# Patient Record
Sex: MALE | Race: BLACK OR AFRICAN AMERICAN | NOT HISPANIC OR LATINO | URBAN - METROPOLITAN AREA
[De-identification: names, ages, dates, MRNs, and addresses within clinical notes are randomized per-mention and may not be internally consistent; named-entity substitution may affect disease eponyms.]

---

## 2019-07-27 ENCOUNTER — EMERGENCY (EMERGENCY)
Facility: HOSPITAL | Age: 29
LOS: 1 days | Discharge: HOME | End: 2019-07-27
Attending: EMERGENCY MEDICINE
Payer: SELF-PAY

## 2019-07-27 VITALS
WEIGHT: 151.02 LBS | SYSTOLIC BLOOD PRESSURE: 145 MMHG | HEART RATE: 71 BPM | OXYGEN SATURATION: 100 % | HEIGHT: 69 IN | DIASTOLIC BLOOD PRESSURE: 83 MMHG | TEMPERATURE: 98 F | RESPIRATION RATE: 18 BRPM

## 2019-07-27 PROCEDURE — 99285 EMERGENCY DEPT VISIT HI MDM: CPT

## 2019-07-27 PROCEDURE — 99053 MED SERV 10PM-8AM 24 HR FAC: CPT

## 2019-07-27 PROCEDURE — 93010 ELECTROCARDIOGRAM REPORT: CPT

## 2019-07-27 NOTE — ED ADULT TRIAGE NOTE - CHIEF COMPLAINT QUOTE
patient states that he has CHest pain started yesterday and getting worse today, pain with deep inspiration, has HX of heart murmur

## 2019-07-28 VITALS
DIASTOLIC BLOOD PRESSURE: 85 MMHG | OXYGEN SATURATION: 100 % | HEART RATE: 72 BPM | TEMPERATURE: 98 F | RESPIRATION RATE: 19 BRPM | SYSTOLIC BLOOD PRESSURE: 143 MMHG

## 2019-07-28 LAB
ALBUMIN SERPL ELPH-MCNC: 4.3 G/DL — SIGNIFICANT CHANGE UP (ref 3.3–5.2)
ALP SERPL-CCNC: 72 U/L — SIGNIFICANT CHANGE UP (ref 40–120)
ALT FLD-CCNC: 16 U/L — SIGNIFICANT CHANGE UP
ANION GAP SERPL CALC-SCNC: 11 MMOL/L — SIGNIFICANT CHANGE UP (ref 5–17)
APTT BLD: 37.5 SEC — HIGH (ref 27.5–36.3)
AST SERPL-CCNC: 24 U/L — SIGNIFICANT CHANGE UP
BASOPHILS # BLD AUTO: 0.03 K/UL — SIGNIFICANT CHANGE UP (ref 0–0.2)
BASOPHILS NFR BLD AUTO: 0.5 % — SIGNIFICANT CHANGE UP (ref 0–2)
BILIRUB SERPL-MCNC: 0.2 MG/DL — LOW (ref 0.4–2)
BUN SERPL-MCNC: 23 MG/DL — HIGH (ref 8–20)
CALCIUM SERPL-MCNC: 8.7 MG/DL — SIGNIFICANT CHANGE UP (ref 8.6–10.2)
CHLORIDE SERPL-SCNC: 106 MMOL/L — SIGNIFICANT CHANGE UP (ref 98–107)
CO2 SERPL-SCNC: 22 MMOL/L — SIGNIFICANT CHANGE UP (ref 22–29)
CREAT SERPL-MCNC: 1.07 MG/DL — SIGNIFICANT CHANGE UP (ref 0.5–1.3)
D DIMER BLD IA.RAPID-MCNC: <150 NG/ML DDU — SIGNIFICANT CHANGE UP
EOSINOPHIL # BLD AUTO: 0.26 K/UL — SIGNIFICANT CHANGE UP (ref 0–0.5)
EOSINOPHIL NFR BLD AUTO: 4 % — SIGNIFICANT CHANGE UP (ref 0–6)
GLUCOSE SERPL-MCNC: 96 MG/DL — SIGNIFICANT CHANGE UP (ref 70–115)
HCT VFR BLD CALC: 43.1 % — SIGNIFICANT CHANGE UP (ref 39–50)
HGB BLD-MCNC: 14.4 G/DL — SIGNIFICANT CHANGE UP (ref 13–17)
IMM GRANULOCYTES NFR BLD AUTO: 0.2 % — SIGNIFICANT CHANGE UP (ref 0–1.5)
INR BLD: 1.1 RATIO — SIGNIFICANT CHANGE UP (ref 0.88–1.16)
LYMPHOCYTES # BLD AUTO: 3.51 K/UL — HIGH (ref 1–3.3)
LYMPHOCYTES # BLD AUTO: 53.4 % — HIGH (ref 13–44)
MCHC RBC-ENTMCNC: 28.9 PG — SIGNIFICANT CHANGE UP (ref 27–34)
MCHC RBC-ENTMCNC: 33.4 GM/DL — SIGNIFICANT CHANGE UP (ref 32–36)
MCV RBC AUTO: 86.4 FL — SIGNIFICANT CHANGE UP (ref 80–100)
MONOCYTES # BLD AUTO: 0.77 K/UL — SIGNIFICANT CHANGE UP (ref 0–0.9)
MONOCYTES NFR BLD AUTO: 11.7 % — SIGNIFICANT CHANGE UP (ref 2–14)
NEUTROPHILS # BLD AUTO: 1.99 K/UL — SIGNIFICANT CHANGE UP (ref 1.8–7.4)
NEUTROPHILS NFR BLD AUTO: 30.2 % — LOW (ref 43–77)
PLATELET # BLD AUTO: 206 K/UL — SIGNIFICANT CHANGE UP (ref 150–400)
POTASSIUM SERPL-MCNC: 4.3 MMOL/L — SIGNIFICANT CHANGE UP (ref 3.5–5.3)
POTASSIUM SERPL-SCNC: 4.3 MMOL/L — SIGNIFICANT CHANGE UP (ref 3.5–5.3)
PROT SERPL-MCNC: 7.3 G/DL — SIGNIFICANT CHANGE UP (ref 6.6–8.7)
PROTHROM AB SERPL-ACNC: 12.7 SEC — SIGNIFICANT CHANGE UP (ref 10–12.9)
RBC # BLD: 4.99 M/UL — SIGNIFICANT CHANGE UP (ref 4.2–5.8)
RBC # FLD: 12.4 % — SIGNIFICANT CHANGE UP (ref 10.3–14.5)
SODIUM SERPL-SCNC: 139 MMOL/L — SIGNIFICANT CHANGE UP (ref 135–145)
TROPONIN T SERPL-MCNC: <0.01 NG/ML — SIGNIFICANT CHANGE UP (ref 0–0.06)
WBC # BLD: 6.57 K/UL — SIGNIFICANT CHANGE UP (ref 3.8–10.5)
WBC # FLD AUTO: 6.57 K/UL — SIGNIFICANT CHANGE UP (ref 3.8–10.5)

## 2019-07-28 PROCEDURE — 80053 COMPREHEN METABOLIC PANEL: CPT

## 2019-07-28 PROCEDURE — 85730 THROMBOPLASTIN TIME PARTIAL: CPT

## 2019-07-28 PROCEDURE — 36415 COLL VENOUS BLD VENIPUNCTURE: CPT

## 2019-07-28 PROCEDURE — 93005 ELECTROCARDIOGRAM TRACING: CPT

## 2019-07-28 PROCEDURE — 85027 COMPLETE CBC AUTOMATED: CPT

## 2019-07-28 PROCEDURE — 85379 FIBRIN DEGRADATION QUANT: CPT

## 2019-07-28 PROCEDURE — 71046 X-RAY EXAM CHEST 2 VIEWS: CPT | Mod: 26

## 2019-07-28 PROCEDURE — 84484 ASSAY OF TROPONIN QUANT: CPT

## 2019-07-28 PROCEDURE — 99284 EMERGENCY DEPT VISIT MOD MDM: CPT | Mod: 25

## 2019-07-28 PROCEDURE — 71046 X-RAY EXAM CHEST 2 VIEWS: CPT

## 2019-07-28 PROCEDURE — 85610 PROTHROMBIN TIME: CPT

## 2019-07-28 NOTE — ED PROVIDER NOTE - OBJECTIVE STATEMENT
28yo male pmhx VSD presents to ED for left sided chest pain x 2 days. Pt notes 1 episode of sharp left sided chest pain x 2 days ago that lasted a few minutes and quickly subsided. Pt states pain has been intermittent over the course of yesterday and this evening, multiple episodes of pain to left chest, worse with deep inspiration. Pt states he is afraid to take deep breath due to fear of pain returning. No further complaints. Denies fever, chills, palpitations, recent travel, diaphoresis, SOB, wheezing, dizziness, HA.

## 2019-07-28 NOTE — ED PROVIDER NOTE - ATTENDING CONTRIBUTION TO CARE
Susan: I performed a face to face bedside interview with patient regarding history of present illness, review of symptoms and past medical history. I completed an independent physical exam.  I have discussed patient's plan of care with advanced care provider.   I agree with note as stated above including HISTORY OF PRESENT ILLNESS, HIV, PAST MEDICAL/SURGICAL/FAMILY/SOCIAL HISTORY, ALLERGIES AND HOME MEDICATIONS, REVIEW OF SYSTEMS, PHYSICAL EXAM, MEDICAL DECISION MAKING and any PROGRESS NOTES during the time I functioned as the attending physician for this patient  unless otherwise noted. My brief assessment is as follows: intermittent pleuritic cp, no hx dvt/pe, no acs risk factors. dimer neg, ekg wnl. xr neg. return precautions

## 2019-07-28 NOTE — ED PROVIDER NOTE - NS ED ROS FT
Gen: denies weakness, malaise/fatigue, fever, chills  Skin: denies rashes, hives  HEENT: denies headaches, LOC, visual changes  Respiratory: denies cough, dyspnea, WILKINS, SOB, wheezing  Cardiovascular: +CHEST PAIN. Denies palpitations, diaphoresis, LE edema  GI: denies abdominal pain, nausea/vomiting, diarrhea/constipation  : denies dysuria, hematuria, frequency, urgency, hesitancy, incontinence of bowel/bladder  MSK: denies limitation on movement, weakness, joint swelling/redness/warmth  Neuro: denies LOC, convulsions/seizures, syncope, headache, dizziness, vertigo

## 2019-07-28 NOTE — ED PROVIDER NOTE - PHYSICAL EXAMINATION
Const: Awake, alert and oriented. In no acute distress. Well appearing.  HEENT: NC/AT. Moist mucous membranes.  Eyes: No scleral icterus. EOMI.  Cardiac: Regular rate and regular rhythm. murmur noted to LLSB. +S1/S2. Peripheral pulses 2+ and symmetric. No LE edema.  Resp: Speaking in full sentences. No evidence of respiratory distress. No wheezes, rales or rhonchi.  Abd: Soft, non-tender, non-distended. Normal bowel sounds in all 4 quadrants. No guarding or rebound.  Back: Spine midline and non-tender. No CVAT.  Skin: No rashes, abrasions or lacerations.  Neuro: Awake, alert & oriented x 3. Moves all extremities symmetrically.

## 2019-07-28 NOTE — ED ADULT NURSE NOTE - OBJECTIVE STATEMENT
Pt c/o left pectoral chest pain since yesterday. Pt states pain presented just once yesterday but became persistent today and makes it difficult to take a deep breath. Pt states when it occurs he gets light headed because he has trouble breathing. Pain radiates to left side of chest towards the back. Pt denies N/V/D, headache, blurred vision, hot flashes. Pt has hx of heart murmur.

## 2019-07-28 NOTE — ED PROVIDER NOTE - CLINICAL SUMMARY MEDICAL DECISION MAKING FREE TEXT BOX
Pt well appearing, in NAD, VSS. HEART SCORE 1. Trop negative x 1, D-dimer negative. EKG with no ischemic changes. Pt stable for dc at this time. Pt instructed to f/u as outpt with PMD/cardiology if symptoms persist.

## 2019-07-28 NOTE — ED ADULT NURSE REASSESSMENT NOTE - NS ED NURSE REASSESS COMMENT FT1
pt hemodynamically stable, PIV removed, refer to flowsheet and chart, pt to be discharged, pt understood discharge instructions and plan of care. Medically cleared by MD Davis.

## 2019-08-01 PROBLEM — R01.1 CARDIAC MURMUR, UNSPECIFIED: Chronic | Status: ACTIVE | Noted: 2019-07-28

## 2019-08-23 ENCOUNTER — APPOINTMENT (OUTPATIENT)
Dept: CARDIOLOGY | Facility: CLINIC | Age: 29
End: 2019-08-23
Payer: COMMERCIAL

## 2019-08-23 ENCOUNTER — NON-APPOINTMENT (OUTPATIENT)
Age: 29
End: 2019-08-23

## 2019-08-23 VITALS
DIASTOLIC BLOOD PRESSURE: 68 MMHG | SYSTOLIC BLOOD PRESSURE: 111 MMHG | OXYGEN SATURATION: 99 % | WEIGHT: 152 LBS | HEIGHT: 69 IN | HEART RATE: 75 BPM | BODY MASS INDEX: 22.51 KG/M2

## 2019-08-23 VITALS — DIASTOLIC BLOOD PRESSURE: 75 MMHG | SYSTOLIC BLOOD PRESSURE: 125 MMHG

## 2019-08-23 DIAGNOSIS — Z87.01 PERSONAL HISTORY OF PNEUMONIA (RECURRENT): ICD-10-CM

## 2019-08-23 DIAGNOSIS — Z87.440 PERSONAL HISTORY OF URINARY (TRACT) INFECTIONS: ICD-10-CM

## 2019-08-23 DIAGNOSIS — Z78.9 OTHER SPECIFIED HEALTH STATUS: ICD-10-CM

## 2019-08-23 DIAGNOSIS — R07.9 CHEST PAIN, UNSPECIFIED: ICD-10-CM

## 2019-08-23 PROCEDURE — 93306 TTE W/DOPPLER COMPLETE: CPT

## 2019-08-23 PROCEDURE — 99204 OFFICE O/P NEW MOD 45 MIN: CPT

## 2019-08-23 PROCEDURE — 93000 ELECTROCARDIOGRAM COMPLETE: CPT

## 2019-08-23 NOTE — PHYSICAL EXAM
[General Appearance - Well Nourished] : well nourished [General Appearance - Well Developed] : well developed [Normal Conjunctiva] : the conjunctiva exhibited no abnormalities [Normal Oral Mucosa] : normal oral mucosa [Heart Rate And Rhythm] : heart rate and rhythm were normal [Heart Sounds] : normal S1 and S2 [Auscultation Breath Sounds / Voice Sounds] : lungs were clear to auscultation bilaterally [Bowel Sounds] : normal bowel sounds [Abdomen Soft] : soft [Cyanosis, Localized] : no localized cyanosis [] : no rash [FreeTextEntry1] : 3/6 Holosystolic murmur along LSB

## 2019-08-23 NOTE — ASSESSMENT
[FreeTextEntry1] : EKG- NSR RSR.\par \par Assessment:\par 1. Chest pain\par 2. Heart murmur-?VSD\par \par Recommendations:\par 1. ECHO\par 2. Stress Test\par 3. F/U after testing\par 4. Patient advised to get his pediatric cardiology records\par

## 2019-08-23 NOTE — HISTORY OF PRESENT ILLNESS
[FreeTextEntry1] : \par \par Patient is a 29-year-old  male with a history of a heart murmur since childhood, patient states he was told he has VSD, patient has noted chest pain in the last one month. Patient's chest pains located in the left side of the chest, localized pain, nonradiating, lasting for a few minutes, he had a total of 3 episodes in the last one month denies any associated nausea vomiting diaphoresis palpitations or syncope. Patient has some shortness of breath or chest pain. All other systems reviewed negative\par \par Patient had gone to [facility] emergency room, hospital records reviewed, patient had a negative d-dimer, negative troponin EKG and chest x-ray were reviewed.

## 2019-10-03 ENCOUNTER — APPOINTMENT (OUTPATIENT)
Dept: CARDIOLOGY | Facility: CLINIC | Age: 29
End: 2019-10-03
Payer: COMMERCIAL

## 2019-10-03 DIAGNOSIS — I49.1 ATRIAL PREMATURE DEPOLARIZATION: ICD-10-CM

## 2019-10-03 PROCEDURE — 93015 CV STRESS TEST SUPVJ I&R: CPT

## 2019-10-18 ENCOUNTER — APPOINTMENT (OUTPATIENT)
Dept: CARDIOLOGY | Facility: CLINIC | Age: 29
End: 2019-10-18
Payer: COMMERCIAL

## 2019-10-18 VITALS
HEART RATE: 73 BPM | OXYGEN SATURATION: 99 % | DIASTOLIC BLOOD PRESSURE: 77 MMHG | WEIGHT: 146 LBS | BODY MASS INDEX: 21.62 KG/M2 | HEIGHT: 69 IN | SYSTOLIC BLOOD PRESSURE: 121 MMHG

## 2019-10-18 PROCEDURE — 99213 OFFICE O/P EST LOW 20 MIN: CPT

## 2019-10-18 NOTE — PHYSICAL EXAM
[General Appearance - Well Nourished] : well nourished [General Appearance - Well Developed] : well developed [Normal Conjunctiva] : the conjunctiva exhibited no abnormalities [Normal Oral Mucosa] : normal oral mucosa [Auscultation Breath Sounds / Voice Sounds] : lungs were clear to auscultation bilaterally [Heart Rate And Rhythm] : heart rate and rhythm were normal [Heart Sounds] : normal S1 and S2 [Bowel Sounds] : normal bowel sounds [Cyanosis, Localized] : no localized cyanosis [Abdomen Soft] : soft [] : no rash [FreeTextEntry1] : 3/6 Holosystolic murmur along LSB

## 2019-10-18 NOTE — ASSESSMENT
[FreeTextEntry1] : EKG- NSR RSR.\par ECHO 8/2019- LVEF 55-60%. Mildly dilated LV, Moderate Aortic Regurgitation. Small perimembranous VSD. Echodensity noted on right coronary cusp\par Stress Test 10/3/2019- Negative Stress Test\par Holter Monitor 10/3/2019- NSR, Freq PAC's and brief runs of PSVT\par \par Assessment:\par 1. VSD\par 2. Bicuspid Aortic Valve with echodensity on right coronary cusp\par \par \par Recommendations:\par Patient is advised to keep his appointment in 11/2019 with Adult Congenital Heart Disease Specialist\par F/U in 3 months\par 4. Patient advised to get his pediatric cardiology records\par

## 2019-10-18 NOTE — HISTORY OF PRESENT ILLNESS
[FreeTextEntry1] : \par \par Patient is a 29-year-old  male with a history of a heart murmur since childhood, patient states he was told he has VSD, patient was seen by me in 8/2019 for chest pain.\par \par Patient presents for a follow up visit. Since last visit patient had an echocardiogram which revealed he has a VSD along with the bicuspid aortic valve. Patient has an upcoming appointment with Adult Congenital Heart  disease specialist.Patient had a stress test which was unremarkable. Patient is doing fine denying any chest pain palpitations or syncope. Overall patient is asymptomatic.

## 2019-11-08 ENCOUNTER — APPOINTMENT (OUTPATIENT)
Dept: PEDIATRIC CARDIOLOGY | Facility: CLINIC | Age: 29
End: 2019-11-08
Payer: COMMERCIAL

## 2019-11-08 VITALS
SYSTOLIC BLOOD PRESSURE: 136 MMHG | HEART RATE: 64 BPM | WEIGHT: 149.69 LBS | HEIGHT: 68.11 IN | DIASTOLIC BLOOD PRESSURE: 92 MMHG | RESPIRATION RATE: 20 BRPM | BODY MASS INDEX: 22.69 KG/M2 | OXYGEN SATURATION: 100 %

## 2019-11-08 PROCEDURE — 99205 OFFICE O/P NEW HI 60 MIN: CPT | Mod: 25

## 2019-11-08 PROCEDURE — 93000 ELECTROCARDIOGRAM COMPLETE: CPT

## 2019-11-08 PROCEDURE — 93325 DOPPLER ECHO COLOR FLOW MAPG: CPT

## 2019-11-08 PROCEDURE — 93320 DOPPLER ECHO COMPLETE: CPT

## 2019-11-08 PROCEDURE — 93303 ECHO TRANSTHORACIC: CPT

## 2019-11-13 NOTE — PHYSICAL EXAM
[General Appearance - In No Acute Distress] : in no acute distress [General Appearance - Alert] : alert [General Appearance - Well Nourished] : well nourished [General Appearance - Well Developed] : well developed [Facies] : the head and face were normal in appearance [Appearance Of Head] : the head was normocephalic [Sclera] : the sclera were normal [Examination Of The Oral Cavity] : mucous membranes were moist and pink [Respiration, Rhythm And Depth] : normal respiratory rhythm and effort [Auscultation Breath Sounds / Voice Sounds] : breath sounds clear to auscultation bilaterally [No Cough] : no cough [Stridor] : no stridor was observed [Normal Chest Appearance] : the chest was normal in appearance [Apical Impulse] : quiet precordium with normal apical impulse [Heart Rate And Rhythm] : normal heart rate and rhythm [Heart Sounds] : normal S1 and S2 [Heart Sounds Gallop] : no gallops [Arterial Pulses] : normal upper and lower extremity pulses with no pulse delay [Heart Sounds Pericardial Friction Rub] : no pericardial rub [Capillary Refill Test] : normal capillary refill [Edema] : no edema [IV] : a grade 4/6   [LMSB] : LMSB  [Holosystolic] : holosystolic [Harsh] : harsh [Abdomen Soft] : soft [Abdomen Tenderness] : non-tender [Nondistended] : nondistended [] : no hepato-splenomegaly [Nail Clubbing] : no clubbing  or cyanosis of the fingers [Musculoskeletal - Swelling] : no joint swelling seen [Motor Tone] : muscle strength and tone were normal [Abnormal Walk] : normal gait [Skin Color & Pigmentation] : normal skin color and pigmentation [Demonstrated Behavior - Infant Nonreactive To Parents] : interactive [Mood] : mood and affect were appropriate for age [FreeTextEntry1] : muscular build

## 2019-11-13 NOTE — HISTORY OF PRESENT ILLNESS
[FreeTextEntry1] : We had the pleasure of seeing Gustavo Dallas in the Adult Congenital Heart Program of [facility] on November 8, 2019 for an initial consultation.  As you know, Gustavo was born with a bicuspid aortic valve and ventricular septal defect.  He reports being followed by Dr. Soto, one of our colleagues in pediatric and congenital interventional cardiology, up until being a teenager.  He was then lost to follow up as he felt well and didn't notice any limitations.  In August of this year, he presented to the [facility] emergency room for chest pain and was discharged home after an initial negative cardiac work up.  He reported chest pain at rest that came and went on their own for about two to three weeks prior to presentation to the ER.  He was evaluated by Dr. Jennings at [facility] in [place] on August 23, 2019 for an initial consultation. Testing at Dr. Jennings's office included:\par \par 8/23/2019, ECG: sinus rhythm\par \par 8/23/2019, TTE: normal LV function, mildly increased LV diameter, bicuspid aortic valve with moderate insufficiency, moderately dilated LA,  perimembranous VSD with left to right shunting, echodensity visualized on the right coronary cusp\par \par 10/3/2019-10/5/2019, Holter: sinus rhythm with sinus arrhythmia, 49 bpm-158 bpm, avg 78 bpm, isolated PVCs, rare ventricular coupelts, frequent PACs, fastest atrial was 168 bpm for 3 beats, longest atrial was 4 beats at 124 bpm; no patient triggered events\par \par 10/3/2019, exercise stress: Total time 11 min, 12 METS, normal HR/BP response, sinus rhythm with frequent PACs that decreased in frequency with exercise, no symptoms; overall: negative stress test by ECG criteria at 88% MPHR\par \par 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  He plays basketball regularly without any limitations.  He denies chest pain, palpitations, shortness of breath, near syncope, syncope, PND, orthopnea, or lower extremity swelling.\par \par He last saw a dentist 2 years ago.  He drinks alcohol socially.  He smokes marijuana daily.\par \par

## 2019-11-13 NOTE — CONSULT LETTER
[Today's Date] : [unfilled] [Name] : Name: [unfilled] [] : : ~~ [Today's Date:] : [unfilled] [Dear  ___:] : Dear Dr. [unfilled]: [Sincerely,] : Sincerely, [Consult - Multiple Provider] : Thank you very much for allowing us to participate in the care of this patient. If you have any questions, please do not hesitate to contact us. [FreeTextEntry4] : Dr. Kolby Jennings [FreeTextEntry5] : [facility] [place] [de-identified] : Fely Hurley, MSN, CPNP-AC, PC\par Pediatric Cardiology, Adult Congenital Cardiology\par [facility] P[facility]\par \par Gustavo Garcia MD\par Pediatric Cardiology\par Adult Congenital Heart Disease\par  of Pediatrics\par [employer] [facility] [employer] [facility]\par

## 2019-11-13 NOTE — REVIEW OF SYSTEMS
[Chest Pain] : chest pain  or discomfort [Feeling Poorly] : not feeling poorly (malaise) [Cyanosis] : no cyanosis [Edema] : no edema [Diaphoresis] : not diaphoretic [Palpitations] : no palpitations [Exercise Intolerance] : no persistence of exercise intolerance [Fast HR] : no tachycardia [Orthopnea] : no orthopnea [Cough] : no cough [Tachypnea] : not tachypneic [Fainting (Syncope)] : no fainting [Shortness Of Breath] : not expressed as feeling short of breath [Headache] : no headache [Easy Bruising] : no tendency for easy bruising [Dizziness] : no dizziness [Easy Bleeding] : no ~M tendency for easy bleeding [Depression] : no depression [Anxiety] : no anxiety

## 2019-11-13 NOTE — DISCUSSION/SUMMARY
[Influenza vaccine is recommended] : Influenza vaccine is recommended [FreeTextEntry1] : In summary, Gustavo is a 29 year old male with history of a bicuspid aortic valve and ventricular septal defect who presented back to care after what appears to be noncardiac chest pain. His work up thus far has revealed interval worsening of the degree of aortic insufficiency associated with his bicuspid aortic valve.  We were able to review an echocardiogram from 2004, at which time he has minimal aortic insufficiency and less distortion to the aortic valve including less sclerosis to the tips of the right cusp.  The right aortic cusp appears to be wind socked into the ventricular septal defect and the coaptation point of the three commissures appears to be where the jet of aortic insufficiency starts.  The VSD is pressure restrictive. The left heart appears and measures mildly dilated.  \par \par Typically, worsening aortic insufficiency in relation to this type of ventricular septal defect is a surgical indication for VSD closure and aortic valve repair.  However, given the bicuspid valve anatomy and current distortion, a successful valve repair may prove challenging.   On the other hand, closure of VSD and buttressing the prolapse the right cusp may prevent acceleration of aortic valve dysfunction associated with a bicuspid valve.  We would recommend a congenital cardiac MRI to evaluate the shunt fraction and obtain volumetric analysis of left sided volumes and aortic insufficiency.  We will also review the data with our CV surgery and congenital cardiology colleagues.  \par \par Of note, Gustavo's blood pressure was elevated at our visit today. We recommended close follow up with you regarding blood pressure management. Should it be persistently elevated, it would be reasonable to consider an ARB given the aortopathy associated with a bicuspid aortic valve.\par \par Follow up pending the results of the MRI and our surgical discussion.  We will keep you updated with the plan moving forward

## 2019-11-13 NOTE — CARDIOLOGY SUMMARY
[Today's Date] : [unfilled] [FreeTextEntry1] : normal sinus rhythm\par possible right atrial enlargement\par  [FreeTextEntry2] : small to moderate perimembranous restrictive (secondary to aneurysmal tissue/right aortic cusp) with left to right shunt\par VSD gradient 100-120 mmHg\par tricommissural, functionally bicuspid aortic valve with left/right fusion\par thickening of tips of right aortic cusp\par mild to moderate aortic insufficiency\par mildly dilated LV (LVIDd 6.05 cm, Z + 2.77)\par Aortic root 3.76 cm (Z + 3.21)

## 2019-11-20 ENCOUNTER — APPOINTMENT (OUTPATIENT)
Dept: MRI IMAGING | Facility: HOSPITAL | Age: 29
End: 2019-11-20
Payer: COMMERCIAL

## 2019-11-20 ENCOUNTER — OUTPATIENT (OUTPATIENT)
Dept: OUTPATIENT SERVICES | Age: 29
LOS: 1 days | End: 2019-11-20

## 2019-11-20 DIAGNOSIS — Q23.1 CONGENITAL INSUFFICIENCY OF AORTIC VALVE: ICD-10-CM

## 2019-11-20 DIAGNOSIS — Q21.0 VENTRICULAR SEPTAL DEFECT: ICD-10-CM

## 2019-11-20 DIAGNOSIS — R07.9 CHEST PAIN, UNSPECIFIED: ICD-10-CM

## 2019-11-20 PROCEDURE — 71555 MRI ANGIO CHEST W OR W/O DYE: CPT | Mod: 26

## 2019-11-20 PROCEDURE — 75565 CARD MRI VELOC FLOW MAPPING: CPT | Mod: 26

## 2019-11-20 PROCEDURE — 75561 CARDIAC MRI FOR MORPH W/DYE: CPT | Mod: 26

## 2020-01-10 ENCOUNTER — APPOINTMENT (OUTPATIENT)
Dept: CARDIOLOGY | Facility: CLINIC | Age: 30
End: 2020-01-10

## 2021-01-15 ENCOUNTER — EMERGENCY (EMERGENCY)
Facility: HOSPITAL | Age: 31
LOS: 1 days | Discharge: HOME | End: 2021-01-15
Payer: COMMERCIAL

## 2021-01-15 VITALS
OXYGEN SATURATION: 98 % | HEIGHT: 69 IN | DIASTOLIC BLOOD PRESSURE: 81 MMHG | TEMPERATURE: 98 F | HEART RATE: 73 BPM | RESPIRATION RATE: 16 BRPM | SYSTOLIC BLOOD PRESSURE: 123 MMHG

## 2021-01-15 LAB — SARS-COV-2 RNA SPEC QL NAA+PROBE: SIGNIFICANT CHANGE UP

## 2021-01-15 PROCEDURE — U0003: CPT

## 2021-01-15 PROCEDURE — 99283 EMERGENCY DEPT VISIT LOW MDM: CPT

## 2021-01-15 PROCEDURE — U0005: CPT

## 2021-01-16 NOTE — ED PROVIDER NOTE - PATIENT PORTAL LINK FT
You can access the FollowMyHealth Patient Portal offered by [facility] by registering at the following website: [email or web address]. By joining [employer]’s FollowMyHealth portal, you will also be able to view your health information using other applications (apps) compatible with our system.

## 2021-02-16 ENCOUNTER — EMERGENCY (EMERGENCY)
Facility: HOSPITAL | Age: 31
LOS: 1 days | Discharge: HOME | End: 2021-02-16
Admitting: EMERGENCY MEDICINE
Payer: COMMERCIAL

## 2021-02-16 VITALS
DIASTOLIC BLOOD PRESSURE: 81 MMHG | TEMPERATURE: 98 F | OXYGEN SATURATION: 99 % | RESPIRATION RATE: 20 BRPM | HEIGHT: 69 IN | HEART RATE: 89 BPM | SYSTOLIC BLOOD PRESSURE: 125 MMHG

## 2021-02-16 LAB — SARS-COV-2 RNA SPEC QL NAA+PROBE: SIGNIFICANT CHANGE UP

## 2021-02-16 PROCEDURE — U0003: CPT

## 2021-02-16 PROCEDURE — 99283 EMERGENCY DEPT VISIT LOW MDM: CPT

## 2021-02-16 PROCEDURE — U0005: CPT

## 2021-02-16 PROCEDURE — 99282 EMERGENCY DEPT VISIT SF MDM: CPT

## 2021-02-16 NOTE — ED ADULT TRIAGE NOTE - CHIEF COMPLAINT QUOTE
Patient presents to ER requesting COVID test, denies symptoms, recent exposure to COVID family member

## 2021-02-16 NOTE — ED PROVIDER NOTE - PATIENT PORTAL LINK FT
You can access the FollowMyHealth Patient Portal offered by [facility] by registering at the following website: [email or web address]. By joining [employer]’s FollowMyHealth portal, you will also be able to view your health information using other applications (apps) compatible with our system.

## 2021-02-16 NOTE — ED PROVIDER NOTE - NS ED ROS FT
Const: no fever , no chills  Eyes: no redness, no discharge  ENT: no ear pain, no throat pain, no congestion  Cardiac: no chest pain  Resp: no cough, no SOB  GI: no abd pain, no n/v/d  : no dysuria   Skin: no rash  Neuro: no HA

## 2021-02-16 NOTE — ED PROVIDER NOTE - CLINICAL SUMMARY MEDICAL DECISION MAKING FREE TEXT BOX
Pt presenting for COVID testing  -Pt does not meet current COVID-19 criteria listed in most updated guidelines as per [facility] protocol/algorithm for admission at this time   -Lengthy discussion with patient regarding home quarantine, follow up with PMD, anticipatory guidance provided and supportive care recommended. Advised immediate return if worsening symptoms, strict return precautions, especially if short of breath, chest pain, inability to tolerate food/liquid. Pt given pre-printed [facility] Novel Coronavirus (COVID19) information packet which contains instructions on time frame of result and how to obtain. Pt verbalized understanding and agreement of plan.

## 2021-03-04 ENCOUNTER — EMERGENCY (EMERGENCY)
Facility: HOSPITAL | Age: 31
LOS: 1 days | Discharge: HOME | End: 2021-03-04
Payer: COMMERCIAL

## 2021-03-04 VITALS
HEART RATE: 85 BPM | SYSTOLIC BLOOD PRESSURE: 142 MMHG | TEMPERATURE: 98 F | HEIGHT: 69 IN | RESPIRATION RATE: 16 BRPM | OXYGEN SATURATION: 99 % | DIASTOLIC BLOOD PRESSURE: 93 MMHG

## 2021-03-04 LAB — SARS-COV-2 RNA SPEC QL NAA+PROBE: SIGNIFICANT CHANGE UP

## 2021-03-04 PROCEDURE — 99283 EMERGENCY DEPT VISIT LOW MDM: CPT

## 2021-03-04 PROCEDURE — U0005: CPT

## 2021-03-04 PROCEDURE — 99284 EMERGENCY DEPT VISIT MOD MDM: CPT

## 2021-03-04 PROCEDURE — U0003: CPT

## 2021-03-04 NOTE — ED PROVIDER NOTE - OBJECTIVE STATEMENT
Pt presenting to the ER for COVID-19 testing. Pt states he lost his sense of taste today and would like to be tested at this time. No other complaints.

## 2021-03-04 NOTE — ED PROVIDER NOTE - PATIENT PORTAL LINK FT
You can access the FollowMyHealth Patient Portal offered by [facility] by registering at the following website: [email or web address]. By joining [employer]’s FollowMyHealth portal, you will also be able to view your health information using other applications (apps) compatible with our system.

## 2021-03-04 NOTE — ED PROVIDER NOTE - CLINICAL SUMMARY MEDICAL DECISION MAKING FREE TEXT BOX
Pt nontoxic appearing, stable vitals, ambulatory with stable saturation without supplemental oxygen. PT does not meet criteria listed in most updated guidelines as per [facility] protocol/algorithm for admission at this time. pt advised about self-quarantine instructions until negative test results and/or symptom resolution. pt advised on hand hygiene, monitoring of symptoms, antipyretic use as well as and fu with primary care provider. Instructions given in pre-printed copy.

## 2021-03-04 NOTE — ED PROVIDER NOTE - IV ALTEPLASE DOOR HIDDEN
No. JESUS screening performed.  STOP BANG Legend: 0-2 = LOW Risk; 3-4 = INTERMEDIATE Risk; 5-8 = HIGH Risk
show

## 2022-04-14 NOTE — ED ADULT NURSE NOTE - NEURO WDL
Staff contacted patient's daughter staff could not leave a message on VM due to mailbox not being set up    Alert and oriented to person, place and time, memory intact, behavior appropriate to situation, PERRL.

## 2022-12-05 NOTE — ED ADULT NURSE NOTE - RESPIRATORY ASSESSMENT
CONSULTATION NOTE - OUTPATIENT      CHIEF COMPLAINT  No chief complaint on file.       Mr. Jain is evaluated today at the request of Dr. Atwood.    HISTORY OF PRESENT ILLNESS  [age] old male with past medical history of bradycardia, hyperlipidemia comes today for sleep apnea evaluation.  Patient's main concern is witnessed apnea as mentioned by his spouse.  This is a chronic problem associated with snoring.  Patient denies daytime sleepiness, gasping/choking spells, morning headaches or excessive night sweats.  No other history suggestive of significant parasomnias, restless leg syndrome or narcolepsy.  Family history of sleep apnea-brother, patient is not taking sleeping aids.    Bedtime is around 10:00 p.m., sleep latency is few minutes, wake after sleep onset is minimal, wake up time is 7:00 a.m. naps no Total sleep time is 8-9 hours.    [place] score is 3    Overall quality of life score ( 1 life is distressing, 4 life is so-so and 7 life is great ):  7              MEDICATIONS  The patient's current medications were reviewed.    HISTORIES  Past Medical History:   Diagnosis Date   • Actinic keratoses    • Basal cell carcinoma of upper back    • Cherry angioma    • Heart murmur    • Hyperlipidemia    • Impaired fasting glucose    • Lentigines    • Mild mitral regurgitation    • Plantar fasciitis of right foot    • Seborrheic keratoses        Past Surgical History:   Procedure Laterality Date   • Colonoscopy  2013; 2011    fax form [facility]. in [place], [place]. DOS-3/25/2013. Impression: Normal Colonoscopy   • Vasectomy  1993    done in [place]        Family History   Problem Relation Age of Onset   • Diabetes Father    • Congestive Heart Failure Father    • Aneurysm Father         AAA   • Lung Disease Mother         on Oxygen for the last few years of her life   • Cancer, Endometrial Sister         uterine cancer   • Diabetes Brother    • Cancer Brother         BCC; MM        Social  History     Socioeconomic History   • Marital status: /Civil Union     Spouse name: Kathryn   • Number of children: 2   • Years of education: Not on file   • Highest education level: Not on file   Occupational History   • Occupation:  group head for    Tobacco Use   • Smoking status: Never Smoker   • Smokeless tobacco: Never Used   Substance and Sexual Activity   • Alcohol use: Yes     Alcohol/week: 6.0 - 7.0 standard drinks     Types: 6 - 7 Standard drinks or equivalent per week   • Drug use: No   • Sexual activity: Yes     Partners: Female     Comment: had vesectomy   Other Topics Concern   • Not on file   Social History Narrative   • Not on file     Social Determinants of Health     Financial Resource Strain: Not on file   Food Insecurity: Not on file   Transportation Needs: Not on file   Physical Activity: Not on file   Stress: Not on file   Social Connections: Not on file   Intimate Partner Violence: Not on file          REVIEW OF SYSTEMS    Review of Systems   Constitutional: Negative.    HENT: Negative.    Eyes: Negative.    Respiratory: Negative.    Cardiovascular: Negative.    Gastrointestinal: Negative.    Genitourinary: Negative.    Musculoskeletal: Negative.    Skin: Negative.    Neurological: Negative.    Endo/Heme/Allergies: Negative.    Psychiatric/Behavioral: Negative.        PHYSICAL EXAM  There were no vitals taken for this visit.   Physical Exam  Vitals and nursing note reviewed.   Constitutional:       General: He is not in acute distress.     Appearance: He is not diaphoretic.   HENT:      Head: Normocephalic and atraumatic.      Comments: Tympanic membrane normal bilaterally, Mallampati score 4, No deviated nasal septum or significant nasal turbinate hypertrophy, neck supple normal ROM, no lymphadenopathy/thyromegaly.       Right Ear: External ear normal.      Left Ear: External ear normal.   Eyes:      General:         Right eye: No discharge.         Left eye: No discharge.       Extraocular Movements: Extraocular movements intact.      Conjunctiva/sclera: Conjunctivae normal.      Pupils: Pupils are equal, round, and reactive to light.   Neck:      Thyroid: No thyromegaly.   Cardiovascular:      Rate and Rhythm: Normal rate.      Heart sounds: No murmur heard.    No friction rub. No gallop.   Pulmonary:      Effort: Pulmonary effort is normal. No respiratory distress.      Breath sounds: No wheezing or rales.   Abdominal:      Palpations: Abdomen is soft.      Tenderness: There is no abdominal tenderness. There is no guarding or rebound.   Musculoskeletal:         General: No swelling or deformity.      Cervical back: Neck supple.      Comments: Lower Extremity Edema absent, no clubbing or cyanosis   Skin:     General: Skin is warm and dry.      Findings: No rash.   Neurological:      General: No focal deficit present.      Mental Status: He is alert.      Cranial Nerves: No cranial nerve deficit.   Psychiatric:         Mood and Affect: Affect normal.         Behavior: Behavior normal.         Judgment: Judgment normal.         LABORATORY  I have reviewed the pertinent laboratory tests. These are the pertinent findings:  · Last electrolytes, hemoglobin within normal limits.    IMAGING  I have reviewed the pertinent imaging study reports. These are the pertinent findings:  · None to review.      ASSESSMENT/PLAN  Judah was seen today for consultation.    Diagnoses and all orders for this visit:    Sleep apnea, unspecified type  - Suspicion for Obstructive Sleep Apnea, history of snoring, witnessed apnea and crowded airway.  - I will get a Home Sleep Study, In lab study if Home Sleep Study negative.  - Patient agreeable to try Positive Airway Pressure therapy, all other treatment options discussed including dental appliances and surgery.  - Driving precautions handout given.  - All questions answered, patient agreeable with plan.  - Follow up after the sleep study.      -     SLEEP STUDY  HOME 4 CHANNEL PORTABLE UNATTENDED; Future    Overweight (BMI 25.0-29.9)    Diet modification [cutting down on carb, lean meat, healthy snacks-nuts etc.] and exercise [30 minutes of walking/day or similar exercises] discussed.     - Dr. Atwood, thank you for your referral. Please contact me if I can be of any further assistance.         WDL

## 2023-07-30 ENCOUNTER — EMERGENCY (EMERGENCY)
Facility: HOSPITAL | Age: 33
LOS: 1 days | Discharge: HOME | End: 2023-07-30
Attending: EMERGENCY MEDICINE
Payer: COMMERCIAL

## 2023-07-30 VITALS
HEART RATE: 72 BPM | TEMPERATURE: 98 F | WEIGHT: 156.09 LBS | OXYGEN SATURATION: 99 % | DIASTOLIC BLOOD PRESSURE: 83 MMHG | SYSTOLIC BLOOD PRESSURE: 129 MMHG | RESPIRATION RATE: 16 BRPM

## 2023-07-30 VITALS
RESPIRATION RATE: 16 BRPM | DIASTOLIC BLOOD PRESSURE: 80 MMHG | HEART RATE: 74 BPM | TEMPERATURE: 98 F | OXYGEN SATURATION: 100 % | SYSTOLIC BLOOD PRESSURE: 132 MMHG

## 2023-07-30 PROCEDURE — 73090 X-RAY EXAM OF FOREARM: CPT | Mod: 26,LT

## 2023-07-30 PROCEDURE — 99284 EMERGENCY DEPT VISIT MOD MDM: CPT

## 2023-07-30 RX ORDER — KETOROLAC TROMETHAMINE 30 MG/ML
30 SYRINGE (ML) INJECTION ONCE
Refills: 0 | Status: DISCONTINUED | OUTPATIENT
Start: 2023-07-30 | End: 2023-07-30

## 2023-07-30 RX ORDER — TETANUS TOXOID, REDUCED DIPHTHERIA TOXOID AND ACELLULAR PERTUSSIS VACCINE, ADSORBED 5; 2.5; 8; 8; 2.5 [IU]/.5ML; [IU]/.5ML; UG/.5ML; UG/.5ML; UG/.5ML
0.5 SUSPENSION INTRAMUSCULAR ONCE
Refills: 0 | Status: COMPLETED | OUTPATIENT
Start: 2023-07-30 | End: 2023-07-30

## 2023-07-30 RX ADMIN — Medication 1 TABLET(S): at 23:51

## 2023-07-30 RX ADMIN — Medication 30 MILLIGRAM(S): at 23:51

## 2023-07-30 RX ADMIN — TETANUS TOXOID, REDUCED DIPHTHERIA TOXOID AND ACELLULAR PERTUSSIS VACCINE, ADSORBED 0.5 MILLILITER(S): 5; 2.5; 8; 8; 2.5 SUSPENSION INTRAMUSCULAR at 23:51

## 2023-07-30 NOTE — ED STATDOCS - CROS ED SKIN ALL NEG
- - - 1) it should be noted, that due to pt's vascular lesions (hx of subacute stroke/SDH), pt may be at a new cognitive baseline, rule out dementia process. her mood lability symptoms have improved since her admission.     2) for periods of agitation and mood lability symptoms, continue low dose zyprexa 2.5 mg po BID for mood stability. f/u QTc < 500 please.    3) family refusing haldol PRN as an option for agitation     4) no indication for inpt psychiatric admission; pt can go home with 24-7 hour care as she needs assistance due to her lack of mobility, following medical clearance.

## 2023-07-30 NOTE — ED STATDOCS - INTERNATIONAL TRAVEL
Please figure out who are genetic taina for psych medications is because I want to get this done ASAP. -SR No

## 2023-07-30 NOTE — ED STATDOCS - OBJECTIVE STATEMENT
32 y/o male presenting with 4 cm Lac on right forearm s/p dog bite 4 hours ago. Pt reports was bit by family dog, unprovoked. Pt was able to shake her off immediately but was bleeding profusely. Denies numbness and tingling in fingers at time of visit but notes tingling in elbow up to the right shoulder. Pt unaware of whether he received tetanus shots.

## 2023-07-30 NOTE — ED ADULT NURSE NOTE - NSFALLUNIVINTERV_ED_ALL_ED
Bed/Stretcher in lowest position, wheels locked, appropriate side rails in place/Call bell, personal items and telephone in reach/Instruct patient to call for assistance before getting out of bed/chair/stretcher/Non-slip footwear applied when patient is off stretcher/[place] to call system/Physically safe environment - no spills, clutter or unnecessary equipment/Purposeful proactive rounding/Room/bathroom lighting operational, light cord in reach

## 2023-07-30 NOTE — ED ADULT TRIAGE NOTE - CHIEF COMPLAINT QUOTE
Ambulatory reporting getting bitten by a known dog while at home. Bleeding controlled prior to triage. Dog UTD with vaccinations. Superficial cut to R forearm, subcutaneous tissue exposed.

## 2023-07-30 NOTE — ED STATDOCS - NSFOLLOWUPINSTRUCTIONS_ED_ALL_ED_FT
augmentin 875mg by mouth 2 times a day for 10 days  motrin 600mg by mouth every 6 hours for pain  tylenol 975mg by mouth every 6 hours  neosporin ointment 3 times a day for 10 days  follow up with primary care doctor in 3 -5 days for wound check

## 2023-07-30 NOTE — ED STATDOCS - PATIENT PORTAL LINK FT
You can access the FollowMyHealth Patient Portal offered by [facility] by registering at the following website: [email or web address]. By joining [email or web address]’s FollowMyHealth portal, you will also be able to view your health information using other applications (apps) compatible with our system.

## 2023-07-30 NOTE — ED STATDOCS - CARE PLAN
Principal Discharge DX:	Injury caused by dog bite  Secondary Diagnosis:	Laceration of forearm, right   1

## 2023-07-30 NOTE — ED STATDOCS - NS_ ATTENDINGSCRIBEDETAILS _ED_A_ED_FT
I, Cem Jacobo, performed the initial face to face bedside interview with this patient regarding history of present illness, review of symptoms and relevant past medical, social and family history.  I completed an independent physical examination.  I was the initial provider who evaluated this patient. I have signed out the follow up of any pending tests (i.e. labs, radiological studies) to the ACP.  I have communicated the patient’s plan of care and disposition with the ACP.  The history, relevant review of systems, past medical and surgical history, medical decision making, and physical examination was documented by the scribe in my presence and I attest to the accuracy of the documentation.

## 2023-07-31 PROCEDURE — 90715 TDAP VACCINE 7 YRS/> IM: CPT

## 2023-07-31 PROCEDURE — 73090 X-RAY EXAM OF FOREARM: CPT

## 2023-07-31 PROCEDURE — 90471 IMMUNIZATION ADMIN: CPT

## 2023-07-31 PROCEDURE — 99283 EMERGENCY DEPT VISIT LOW MDM: CPT | Mod: 25

## 2023-07-31 PROCEDURE — 96372 THER/PROPH/DIAG INJ SC/IM: CPT

## 2023-07-31 RX ORDER — IBUPROFEN 200 MG
1 TABLET ORAL
Qty: 28 | Refills: 0
Start: 2023-07-31 | End: 2023-08-06

## 2023-08-07 ENCOUNTER — NON-APPOINTMENT (OUTPATIENT)
Age: 33
End: 2023-08-07

## 2023-08-07 ENCOUNTER — APPOINTMENT (OUTPATIENT)
Dept: INTERNAL MEDICINE | Facility: CLINIC | Age: 33
End: 2023-08-07
Payer: COMMERCIAL

## 2023-08-07 VITALS
HEART RATE: 62 BPM | DIASTOLIC BLOOD PRESSURE: 76 MMHG | HEIGHT: 68 IN | SYSTOLIC BLOOD PRESSURE: 132 MMHG | OXYGEN SATURATION: 98 %

## 2023-08-07 DIAGNOSIS — Z83.3 FAMILY HISTORY OF DIABETES MELLITUS: ICD-10-CM
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
## 2023-08-07 DIAGNOSIS — S51.851S: ICD-10-CM

## 2023-08-07 DIAGNOSIS — Z80.42 FAMILY HISTORY OF MALIGNANT NEOPLASM OF PROSTATE: ICD-10-CM

## 2023-08-07 DIAGNOSIS — R01.1 CARDIAC MURMUR, UNSPECIFIED: ICD-10-CM

## 2023-08-07 DIAGNOSIS — Z00.00 ENCOUNTER FOR GENERAL ADULT MEDICAL EXAMINATION W/OUT ABNORMAL FINDINGS: ICD-10-CM

## 2023-08-07 DIAGNOSIS — Q21.0 VENTRICULAR SEPTAL DEFECT: ICD-10-CM

## 2023-08-07 DIAGNOSIS — Q23.1 CONGENITAL INSUFFICIENCY OF AORTIC VALVE: ICD-10-CM

## 2023-08-07 DIAGNOSIS — Z23 ENCOUNTER FOR IMMUNIZATION: ICD-10-CM

## 2023-08-07 DIAGNOSIS — Z82.49 FAMILY HISTORY OF ISCHEMIC HEART DISEASE AND OTHER DISEASES OF THE CIRCULATORY SYSTEM: ICD-10-CM

## 2023-08-07 PROCEDURE — 93000 ELECTROCARDIOGRAM COMPLETE: CPT

## 2023-08-07 PROCEDURE — 99406 BEHAV CHNG SMOKING 3-10 MIN: CPT | Mod: NC

## 2023-08-07 PROCEDURE — 99385 PREV VISIT NEW AGE 18-39: CPT | Mod: 25

## 2023-08-07 PROCEDURE — 36415 COLL VENOUS BLD VENIPUNCTURE: CPT

## 2023-08-07 NOTE — PHYSICAL EXAM
[No Acute Distress] : no acute distress [Well Nourished] : well nourished [Well Developed] : well developed [Well-Appearing] : well-appearing [Normal Sclera/Conjunctiva] : normal sclera/conjunctiva [PERRL] : pupils equal round and reactive to light [EOMI] : extraocular movements intact [Normal Outer Ear/Nose] : the outer ears and nose were normal in appearance [Normal Oropharynx] : the oropharynx was normal [No JVD] : no jugular venous distention [No Lymphadenopathy] : no lymphadenopathy [Supple] : supple [Thyroid Normal, No Nodules] : the thyroid was normal and there were no nodules present [No Respiratory Distress] : no respiratory distress  [No Accessory Muscle Use] : no accessory muscle use [Clear to Auscultation] : lungs were clear to auscultation bilaterally [Normal Rate] : normal rate  [Regular Rhythm] : with a regular rhythm [Normal S1, S2] : normal S1 and S2 [No Carotid Bruits] : no carotid bruits [No Abdominal Bruit] : a ~M bruit was not heard ~T in the abdomen [No Varicosities] : no varicosities [Pedal Pulses Present] : the pedal pulses are present [No Edema] : there was no peripheral edema [No Palpable Aorta] : no palpable aorta [No Extremity Clubbing/Cyanosis] : no extremity clubbing/cyanosis [Soft] : abdomen soft [Non Tender] : non-tender [Non-distended] : non-distended [No Masses] : no abdominal mass palpated [No HSM] : no HSM [Normal Bowel Sounds] : normal bowel sounds [Normal Posterior Cervical Nodes] : no posterior cervical lymphadenopathy [Normal Anterior Cervical Nodes] : no anterior cervical lymphadenopathy [No CVA Tenderness] : no CVA  tenderness [No Spinal Tenderness] : no spinal tenderness [No Joint Swelling] : no joint swelling [Grossly Normal Strength/Tone] : grossly normal strength/tone [No Rash] : no rash [Coordination Grossly Intact] : coordination grossly intact [No Focal Deficits] : no focal deficits [Normal Gait] : normal gait [Deep Tendon Reflexes (DTR)] : deep tendon reflexes were 2+ and symmetric [Normal Affect] : the affect was normal [Normal Insight/Judgement] : insight and judgment were intact [de-identified] : Systolic Ejection Murmur at the apex &  mitral area.radiating to carotid

## 2023-08-07 NOTE — ASSESSMENT
[FreeTextEntry1] : Health Maintainance: EKG -? Left Ventricular Hypertrophy; Will do Complete annual bloodwork today VSD/Biscuspid Aortic Valve; will need ECHO; referred to Cardiologist Dog Bite/ Right Forearm Wound: Wound appears to be healing well; minimal erythma/ pain ; no discharge/ wound gapping; Wound sutures removed using sterile technique, mupirocin ointment applied and dressed with steristrips. -Advised to followup in clinic after Cardiology consult & ECHO.

## 2023-08-07 NOTE — COUNSELING
[Behavioral health counseling provided] : Behavioral health counseling provided [No] : Risk of tobacco use and health benefits of smoking cessation discussed: No [FreeTextEntry1] : 3

## 2023-08-07 NOTE — HISTORY OF PRESENT ILLNESS
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 Last F/u with Cardiologist in 2019. Gives h/o dog bite (cousins dog, no signs of rabies as per patient)  on  07/30. Went to ER at [facility], got the Tetanus &? Rabies/ Antibiotic. He was sent home on Augmentin.

## 2023-08-07 NOTE — HEALTH RISK ASSESSMENT
[Very Good] : ~his/her~ current health as very good [Yes] : Yes [2 - 4 times a month (2 pts)] : 2-4 times a month (2 points) [1 or 2 (0 pts)] : 1 or 2 (0 points) [Never (0 pts)] : Never (0 points) [No falls in past year] : Patient reported no falls in the past year [Little interest or pleasure doing things] : 1) Little interest or pleasure doing things [Feeling down, depressed, or hopeless] : 2) Feeling down, depressed, or hopeless [0] : 2) Feeling down, depressed, or hopeless: Not at all (0) [HIV Test offered] : HIV Test offered [Hepatitis C test offered] : Hepatitis C test offered [With Family] : lives with family [Employed] : employed [College] : College [Single] : single [Sexually Active] : sexually active [Feels Safe at Home] : Feels safe at home [Fully functional (bathing, dressing, toileting, transferring, walking, feeding)] : Fully functional (bathing, dressing, toileting, transferring, walking, feeding) [Fully functional (using the telephone, shopping, preparing meals, housekeeping, doing laundry, using] : Fully functional and needs no help or supervision to perform IADLs (using the telephone, shopping, preparing meals, housekeeping, doing laundry, using transportation, managing medications and managing finances) [Smoke Detector] : smoke detector [Carbon Monoxide Detector] : carbon monoxide detector [Seat Belt] :  uses seat belt [Current] : Current [0-4] : 0-4 [Change in mental status noted] : No change in mental status noted [Reports changes in hearing] : Reports no changes in hearing [Reports changes in vision] : Reports no changes in vision [Reports changes in dental health] : Reports no changes in dental health [Guns at Home] : no guns at home [FreeTextEntry2] : [employer] [Patient/Caregiver not ready to engage] : , patient/caregiver not ready to engage [AdvancecareDate] : 08/07/2023

## 2023-08-08 LAB
ALBUMIN SERPL ELPH-MCNC: 4 G/DL
ALP BLD-CCNC: 77 U/L
ALT SERPL-CCNC: 17 U/L
ANION GAP SERPL CALC-SCNC: 14 MMOL/L
AST SERPL-CCNC: 26 U/L
BASOPHILS # BLD AUTO: 0.04 K/UL
BASOPHILS NFR BLD AUTO: 0.8 %
BILIRUB SERPL-MCNC: <0.2 MG/DL
BUN SERPL-MCNC: 17 MG/DL
CALCIUM SERPL-MCNC: 9 MG/DL
CHLORIDE SERPL-SCNC: 109 MMOL/L
CHOLEST SERPL-MCNC: 151 MG/DL
CO2 SERPL-SCNC: 20 MMOL/L
CREAT SERPL-MCNC: 1.01 MG/DL
EGFR: 99 ML/MIN/1.73M2
EOSINOPHIL # BLD AUTO: 0.39 K/UL
EOSINOPHIL NFR BLD AUTO: 7.5 %
ESTIMATED AVERAGE GLUCOSE: 111 MG/DL
GLUCOSE SERPL-MCNC: 101 MG/DL
HBA1C MFR BLD HPLC: 5.5 %
HCT VFR BLD CALC: 43.6 %
HCV AB SER QL: NONREACTIVE
HCV S/CO RATIO: 0.08 S/CO
HDLC SERPL-MCNC: 44 MG/DL
HGB BLD-MCNC: 14.3 G/DL
HIV1+2 AB SPEC QL IA.RAPID: NONREACTIVE
IMM GRANULOCYTES NFR BLD AUTO: 0.6 %
LDLC SERPL CALC-MCNC: 91 MG/DL
LYMPHOCYTES # BLD AUTO: 2.64 K/UL
LYMPHOCYTES NFR BLD AUTO: 51 %
MAN DIFF?: NORMAL
MCHC RBC-ENTMCNC: 28.9 PG
MCHC RBC-ENTMCNC: 32.8 GM/DL
MCV RBC AUTO: 88.1 FL
MONOCYTES # BLD AUTO: 0.52 K/UL
MONOCYTES NFR BLD AUTO: 10 %
NEUTROPHILS # BLD AUTO: 1.56 K/UL
NEUTROPHILS NFR BLD AUTO: 30.1 %
NONHDLC SERPL-MCNC: 107 MG/DL
PLATELET # BLD AUTO: 219 K/UL
POTASSIUM SERPL-SCNC: 4.2 MMOL/L
PROT SERPL-MCNC: 6.9 G/DL
RBC # BLD: 4.95 M/UL
RBC # FLD: 12.9 %
SODIUM SERPL-SCNC: 143 MMOL/L
TRIGL SERPL-MCNC: 85 MG/DL
TSH SERPL-ACNC: 1.88 UIU/ML
WBC # FLD AUTO: 5.18 K/UL

## 2025-02-12 NOTE — ED ADULT NURSE NOTE - OBJECTIVE STATEMENT
pt a&ox3, vss, c/o dog bite to R forearm, dog UTD on vaccinations per pt. bleeding controlled @ this time respirations even and unlabored, pt in NAD @ this time, meds gvn per rx. POC discussed w/ patient. will continue to reassess
Detail Level: Generalized